# Patient Record
Sex: MALE | Race: WHITE | NOT HISPANIC OR LATINO | URBAN - METROPOLITAN AREA
[De-identification: names, ages, dates, MRNs, and addresses within clinical notes are randomized per-mention and may not be internally consistent; named-entity substitution may affect disease eponyms.]

---

## 2019-08-06 ENCOUNTER — OUTPATIENT (OUTPATIENT)
Dept: OUTPATIENT SERVICES | Facility: HOSPITAL | Age: 29
LOS: 1 days | Discharge: HOME | End: 2019-08-06
Payer: COMMERCIAL

## 2019-08-06 DIAGNOSIS — R05 COUGH: ICD-10-CM

## 2019-08-06 PROCEDURE — 71046 X-RAY EXAM CHEST 2 VIEWS: CPT | Mod: 26

## 2021-09-18 ENCOUNTER — TRANSCRIPTION ENCOUNTER (OUTPATIENT)
Age: 31
End: 2021-09-18

## 2021-12-01 ENCOUNTER — TRANSCRIPTION ENCOUNTER (OUTPATIENT)
Age: 31
End: 2021-12-01

## 2022-02-16 ENCOUNTER — TRANSCRIPTION ENCOUNTER (OUTPATIENT)
Age: 32
End: 2022-02-16

## 2022-11-30 ENCOUNTER — APPOINTMENT (OUTPATIENT)
Dept: ORTHOPEDIC SURGERY | Facility: CLINIC | Age: 32
End: 2022-11-30

## 2022-11-30 ENCOUNTER — NON-APPOINTMENT (OUTPATIENT)
Age: 32
End: 2022-11-30

## 2022-11-30 VITALS — HEIGHT: 68 IN | WEIGHT: 220 LBS | BODY MASS INDEX: 33.34 KG/M2

## 2022-11-30 PROBLEM — Z00.00 ENCOUNTER FOR PREVENTIVE HEALTH EXAMINATION: Status: ACTIVE | Noted: 2022-11-30

## 2022-11-30 PROCEDURE — 73610 X-RAY EXAM OF ANKLE: CPT | Mod: RT

## 2022-11-30 PROCEDURE — 99072 ADDL SUPL MATRL&STAF TM PHE: CPT

## 2022-11-30 PROCEDURE — 99203 OFFICE O/P NEW LOW 30 MIN: CPT | Mod: ACP

## 2022-11-30 NOTE — HISTORY OF PRESENT ILLNESS
[de-identified] :  31-year-old male here for an evaluation injury sustained to the right ankle, patient states this injury happened November 19, 2022, patient states that he was at work, he works for [employer], he states that he was wearing I high boot and his right foot went on to small pot hole, and he twisted his ankle.  \par Patient states that he is all is improving, but he still and significant pain over the lateral aspect of the ankle.  \par

## 2022-11-30 NOTE — IMAGING
[de-identified] : EXAMINATION RIGHT ANKLE\par - Inspection: minimal swelling over the lateral aspect of the ankle, no ecchymosis, no erythema notice.  \par Skin is intact.\par - Palpation:  Minimal discomfort over the anterior talofibular ligament, some discomfort over the calcaneal fibular ligament and tenderness over the posterior talofibular ligament as well as the peroneal tendon.  \par Nontender over the medial or lateral malleolus.  \par Nontender over the ankle mortise.  \par Nontender the anterior Tibialis, nontender posterior tibialis tendon.  \par   Calf is soft, nontender\par - Range of Motion: good range of motion to dorsiflexion and plantar flexion with no end range pain no laxity to inversion and eversion.\par - Motor Strength:  Good motor strength to dorsiflexion and plantar flexion.\par - Neurovascular intact.\par \par -   TESTS:\par  negative Homans.  \par Negative French's.\par \par  RADIOGRAPHS:\par   X-ray of the right ankle was done in office today, this x-ray was negative for any acute fracture or dislocation, no soft tissue swelling noted.\par \par \par \par \par \par \par \par \par \par \par \par \par \par \par \par \par \par

## 2022-11-30 NOTE — DISCUSSION/SUMMARY
[de-identified] : \par IMPRESSION:  Right ankle sprain and peroneal tendinitis.\par \par \par PLAN:  Since patient is required to do heavy lifting and walking on on needed around and his job is quite demanding, this time he is total temporary disabled unable to return to work.  \par Patient was advised for physical therapy, prescription was given.  \par Patient was advised for topical anti-inflammatories such as Aspercreme move Voltaren gel to the right ankle.  \par Patient was advised for the use of compression socks for a \par Patient was advised to do weight-bearing as tolerated.  \par Patient was advised to follow up in about 4 weeks for repeat evaluation.\par \par \par FOLLOW-UP:  Four weeks.\par \par \par \par SUPERVISING PHYSICIAN DR. BEAR.\par

## 2023-01-05 ENCOUNTER — APPOINTMENT (OUTPATIENT)
Dept: ORTHOPEDIC SURGERY | Facility: CLINIC | Age: 33
End: 2023-01-05
Payer: OTHER MISCELLANEOUS

## 2023-01-05 ENCOUNTER — NON-APPOINTMENT (OUTPATIENT)
Age: 33
End: 2023-01-05

## 2023-01-05 DIAGNOSIS — S93.491A SPRAIN OF OTHER LIGAMENT OF RIGHT ANKLE, INITIAL ENCOUNTER: ICD-10-CM

## 2023-01-05 DIAGNOSIS — M76.71 PERONEAL TENDINITIS, RIGHT LEG: ICD-10-CM

## 2023-01-05 PROCEDURE — 99072 ADDL SUPL MATRL&STAF TM PHE: CPT

## 2023-01-05 PROCEDURE — 99213 OFFICE O/P EST LOW 20 MIN: CPT

## 2023-01-05 NOTE — REASON FOR VISIT
[FreeTextEntry2] : work injury of 11/19/22 to his right ankle Doing therapy 12 visits feels 85% better little pain in the calf is working light duty

## 2023-01-05 NOTE — DISCUSSION/SUMMARY
[de-identified] : \par IMPRESSION:  Right ankle sprain and peroneal tendinitis.\par \par \par  \par Patient was advised for physical therapy, prescription was given.  \par Patient was advised for topical anti-inflammatories such as Aspercreme move Voltaren gel to the right ankle.  \par Patient was advised for the use of compression socks for a \par Patient was advised to do weight-bearing as tolerated.  .\par \par \par FOLLOW-UP:   2 month\par  cleared to return to work full duty in a few weeks   note provided\par  at this time  is totally disabling to work but will return in a few weeks to full duty he is working light duty right now

## 2023-01-05 NOTE — HISTORY OF PRESENT ILLNESS
[de-identified] :  31-year-old male here for an evaluation injury sustained to the right ankle, patient states this injury happened November 19, 2022, patient states that he was at work, he works for [employer], he states that he was wearing I high boot and his right foot went on to small pot hole, and he twisted his ankle.  \par Patient states that he is all is improving, but he still and significant pain over the lateral aspect of the ankle.  \par

## 2023-01-05 NOTE — IMAGING
[de-identified] : EXAMINATION RIGHT ANKLE\par - Inspection: minimal swelling over the lateral aspect of the ankle, no ecchymosis, no erythema notice.  \par Skin is intact.\par - Palpation:  Minimal discomfort over the anterior talofibular ligament, some discomfort over the calcaneal fibular ligament and tenderness over the posterior talofibular ligament as well as the peroneal tendon.  \par Nontender over the medial or lateral malleolus.  \par Nontender over the ankle mortise.  \par Nontender the anterior Tibialis, nontender posterior tibialis tendon.  \par   Calf is soft, nontender\par - Range of Motion: good range of motion to dorsiflexion and plantar flexion with no end range pain no laxity to inversion and eversion.\par - Motor Strength:  Good motor strength to dorsiflexion and plantar flexion.\par - Neurovascular intact.\par \par -   TESTS:\par  negative Homans.  \par Negative French's.\par \par  RADIOGRAPHS:\par   X-ray of the right ankle was done in office today, this x-ray was negative for any acute fracture or dislocation, no soft tissue swelling noted.\par \par \par \par \par \par \par \par \par \par \par \par \par \par \par \par \par \par

## 2023-01-16 ENCOUNTER — NON-APPOINTMENT (OUTPATIENT)
Age: 33
End: 2023-01-16

## 2023-03-14 ENCOUNTER — APPOINTMENT (OUTPATIENT)
Dept: ORTHOPEDIC SURGERY | Facility: CLINIC | Age: 33
End: 2023-03-14

## 2023-11-22 ENCOUNTER — NON-APPOINTMENT (OUTPATIENT)
Age: 33
End: 2023-11-22

## 2024-02-27 ENCOUNTER — NON-APPOINTMENT (OUTPATIENT)
Age: 34
End: 2024-02-27

## 2024-03-07 ENCOUNTER — NON-APPOINTMENT (OUTPATIENT)
Age: 34
End: 2024-03-07

## 2024-06-30 ENCOUNTER — NON-APPOINTMENT (OUTPATIENT)
Age: 34
End: 2024-06-30

## 2024-09-09 ENCOUNTER — NON-APPOINTMENT (OUTPATIENT)
Age: 34
End: 2024-09-09

## 2024-09-15 ENCOUNTER — NON-APPOINTMENT (OUTPATIENT)
Age: 34
End: 2024-09-15